# Patient Record
Sex: MALE | Race: WHITE | NOT HISPANIC OR LATINO | Employment: FULL TIME | URBAN - METROPOLITAN AREA
[De-identification: names, ages, dates, MRNs, and addresses within clinical notes are randomized per-mention and may not be internally consistent; named-entity substitution may affect disease eponyms.]

---

## 2018-09-11 ENCOUNTER — APPOINTMENT (OUTPATIENT)
Dept: RADIOLOGY | Facility: CLINIC | Age: 43
End: 2018-09-11
Payer: COMMERCIAL

## 2018-09-11 ENCOUNTER — OFFICE VISIT (OUTPATIENT)
Dept: URGENT CARE | Facility: CLINIC | Age: 43
End: 2018-09-11
Payer: COMMERCIAL

## 2018-09-11 VITALS
SYSTOLIC BLOOD PRESSURE: 134 MMHG | DIASTOLIC BLOOD PRESSURE: 84 MMHG | WEIGHT: 272 LBS | HEART RATE: 88 BPM | BODY MASS INDEX: 42.69 KG/M2 | RESPIRATION RATE: 16 BRPM | HEIGHT: 67 IN | TEMPERATURE: 99.2 F | OXYGEN SATURATION: 99 %

## 2018-09-11 DIAGNOSIS — S63.502A SPRAIN OF LEFT WRIST, INITIAL ENCOUNTER: Primary | ICD-10-CM

## 2018-09-11 DIAGNOSIS — M25.532 LEFT WRIST PAIN: ICD-10-CM

## 2018-09-11 PROCEDURE — 73110 X-RAY EXAM OF WRIST: CPT

## 2018-09-11 PROCEDURE — 99203 OFFICE O/P NEW LOW 30 MIN: CPT | Performed by: PHYSICIAN ASSISTANT

## 2018-09-11 RX ORDER — GABAPENTIN 600 MG/1
300 TABLET ORAL 3 TIMES DAILY
COMMUNITY

## 2018-09-11 NOTE — PATIENT INSTRUCTIONS
Wrist Sprain   WHAT YOU NEED TO KNOW:   What is a wrist sprain? A wrist sprain happens when one or more ligaments in your wrist stretch or tear  Ligaments are tough tissues that connect bones and keep them in place, and support your joints  A fall onto your outstretched hand can cause a wrist sprain  An injury that causes your wrist to twist can also cause a sprain  This may happen during sports, such as biking, skiing, or snowboarding  What are the signs and symptoms of a wrist sprain? · Swelling and tenderness    · Pain and stiffness     · Bruising or changes in skin color    · Popping sound in your wrist when you move it  How is a wrist sprain diagnosed? Your healthcare provider will ask how you injured your wrist  The provider will examine your wrist and hand and ask about your symptoms  You may need x-rays, an MRI, or a CT scan of your wrist  You may be given contrast liquid to help the pictures show up better  Tell the healthcare provider if you have ever had an allergic reaction to contrast liquid  Do not enter the MRI room with anything metal  Metal can cause serious injury  Tell the healthcare provider if you have any metal in or on your body  How is a wrist sprain treated? Treatment depends on how severe your sprain is  You may need any of the following:  · NSAIDs , such as ibuprofen, help decrease swelling, pain, and fever  NSAIDs can cause stomach bleeding or kidney problems in certain people  If you take blood thinner medicine, always ask your healthcare provider if NSAIDs are safe for you  Always read the medicine label and follow directions  · Acetaminophen  decreases pain and fever  It is available without a doctor's order  Ask how much to take and how often to take it  Follow directions  Read the labels of all other medicines you are using to see if they also contain acetaminophen, or ask your doctor or pharmacist  Acetaminophen can cause liver damage if not taken correctly   Do not use more than 4 grams (4,000 milligrams) total of acetaminophen in one day  · A splint or cast  helps support your wrist and prevent more damage  · Surgery  may be needed if you have a severe sprain  Arthroscopy may be done to examine the inside of your wrist joint and repair ligament damage  Arthroscopy uses a scope that is inserted through a small incision  You may need open surgery to reconnect torn ligaments to the bone  · Physical therapy  may be recommended  A physical therapist teaches you exercises to help improve movement and strength, and to decrease pain  How can I manage my symptoms? · Rest  your wrist for at least 48 hours  Avoid activities that cause pain  · Ice  your wrist for 15 to 20 minutes every hour or as directed  Use an ice pack, or put crushed ice in a plastic bag  Cover it with a towel before you put it on your wrist  Ice helps prevent tissue damage and decreases swelling and pain  · Compress  your wrist with an elastic bandage  This will help decrease swelling, support your wrist, and help it heal  Wear your wrist wrap as directed  The elastic bandage should be snug but not tight  · Elevate  your wrist above the level of your heart as often as you can  This will help decrease swelling and pain  Prop your wrist on pillows or blankets to keep it elevated comfortably  When should I seek immediate care? · You have severe pain or swelling  · Your injured wrist is red or has red streaks spreading from the injured area  · You have new trouble moving your hands, fingers, or wrist     · Your wrist, hand, or fingers feel cold or numb  · Your fingernails turn blue or gray  When should I contact my healthcare provider? · Your symptoms get worse  · Your sprain does not get better within 2 weeks  · You have questions or concerns about your condition or care  CARE AGREEMENT:   You have the right to help plan your care   Learn about your health condition and how it may be treated  Discuss treatment options with your caregivers to decide what care you want to receive  You always have the right to refuse treatment  The above information is an  only  It is not intended as medical advice for individual conditions or treatments  Talk to your doctor, nurse or pharmacist before following any medical regimen to see if it is safe and effective for you  © 2017 [employer]  Information is for End User's use only and may not be sold, redistributed or otherwise used for commercial purposes  All illustrations and images included in CareNotes® are the copyrighted property of [employer]  or [employer]

## 2018-09-13 NOTE — PROGRESS NOTES
[facility]        NAME: Elias Bhandari is a 41 y o  male  : 1975    MRN: [id number]  DATE: 2018  TIME: 8:15 AM    Assessment and Plan   Sprain of left wrist, initial encounter [S63 502A]  1  Sprain of left wrist, initial encounter  XR wrist 3+ vw left    Ambulatory referral to Physical Therapy     Patient Instructions     Apply ice 10-20 min at a time at least 4 times a day  Take tylenol/motrin for pain  Go to PT for further management  Follow up with PCP in 3-5 days  Proceed to  ER if symptoms worsen  Chief Complaint     Chief Complaint   Patient presents with    Wrist Pain     Pt fell a few months ago and injured left wrist  He was never evaluated for this injury  Yesterday when picking up 50 pound bag of dog food, pt noted sharp left wrist pain  Now c/o 10 wrist pain, worse with   History of Present Illness       Wrist Pain    The pain is present in the left wrist  This is a new problem  The current episode started yesterday  There has been a history of trauma  The problem occurs constantly  The problem has been gradually worsening  The quality of the pain is described as aching  The pain is moderate  Associated symptoms include joint swelling  Pertinent negatives include no fever, inability to bear weight, itching, joint locking, limited range of motion, numbness, stiffness or tingling  The symptoms are aggravated by activity  He has tried NSAIDS for the symptoms  The treatment provided mild relief  Family history does not include gout or rheumatoid arthritis  There is no history of diabetes, gout, osteoarthritis or rheumatoid arthritis  Review of Systems   Review of Systems   Constitutional: Negative for fever  Musculoskeletal: Positive for arthralgias and myalgias  Negative for back pain, gait problem, gout, joint swelling, neck pain, neck stiffness and stiffness  Skin: Negative for color change, itching, pallor, rash and wound     Neurological: Negative for dizziness, tingling, tremors, seizures, syncope, facial asymmetry, speech difficulty, weakness, light-headedness, numbness and headaches  Current Medications       Current Outpatient Prescriptions:     ClomiPRAMINE HCl (ANAFRANIL PO), Take by mouth, Disp: , Rfl:     CLONAZEPAM PO, Take by mouth, Disp: , Rfl:     CLONIDINE HCL PO, Take by mouth, Disp: , Rfl:     gabapentin (NEURONTIN) 600 MG tablet, Take 300 mg by mouth 3 (three) times a day, Disp: , Rfl:     METFORMIN HCL PO, Take by mouth, Disp: , Rfl:     Current Allergies     Allergies as of 09/11/2018    (No Known Allergies)            The following portions of the patient's history were reviewed and updated as appropriate: allergies, current medications, past family history, past medical history, past social history, past surgical history and problem list      Past Medical History:   Diagnosis Date    Anxiety     [facility]        History reviewed  No pertinent surgical history  No family history on file  Medications have been verified  Objective   /84   Pulse 88   Temp 99 2 °F (37 3 °C)   Resp 16   Ht 5' 7" (1 702 m)   Wt 123 kg (272 lb)   SpO2 99%   BMI 42 60 kg/m²        Physical Exam     Physical Exam   Constitutional: He appears well-developed and well-nourished  Cardiovascular: Normal rate, regular rhythm, normal heart sounds and intact distal pulses  Exam reveals no gallop and no friction rub  No murmur heard  Pulmonary/Chest: Effort normal and breath sounds normal  No respiratory distress  He has no wheezes  He has no rales  Abdominal: Soft  Bowel sounds are normal  He exhibits no distension  There is no tenderness  There is no rebound and no guarding  Musculoskeletal:        Left wrist: He exhibits decreased range of motion, tenderness and swelling  He exhibits no bony tenderness, no effusion, no crepitus, no deformity and no laceration

## 2020-01-15 DIAGNOSIS — F32.A DEPRESSED: Primary | ICD-10-CM

## 2020-01-15 RX ORDER — VILAZODONE HYDROCHLORIDE 40 MG/1
TABLET ORAL
Qty: 30 TABLET | Refills: 0 | Status: SHIPPED | OUTPATIENT
Start: 2020-01-15

## 2020-01-15 RX ORDER — CLOMIPRAMINE HYDROCHLORIDE 75 MG/1
CAPSULE ORAL
Qty: 90 CAPSULE | Refills: 0 | Status: SHIPPED | OUTPATIENT
Start: 2020-01-15

## 2020-02-11 DIAGNOSIS — F32.A DEPRESSED: ICD-10-CM

## 2020-02-12 RX ORDER — CLOMIPRAMINE HYDROCHLORIDE 75 MG/1
CAPSULE ORAL
Qty: 90 CAPSULE | Refills: 0 | OUTPATIENT
Start: 2020-02-12

## 2020-02-12 RX ORDER — VILAZODONE HYDROCHLORIDE 40 MG/1
TABLET ORAL
Qty: 30 TABLET | Refills: 0 | OUTPATIENT
Start: 2020-02-12

## 2020-02-12 RX ORDER — CLONAZEPAM 1 MG/1
TABLET ORAL
Qty: 120 TABLET | OUTPATIENT
Start: 2020-02-12

## 2020-02-13 DIAGNOSIS — F32.A DEPRESSED: ICD-10-CM

## 2020-02-14 RX ORDER — CLOMIPRAMINE HYDROCHLORIDE 75 MG/1
CAPSULE ORAL
Qty: 90 CAPSULE | Refills: 0 | OUTPATIENT
Start: 2020-02-14

## 2020-02-14 RX ORDER — CLONAZEPAM 1 MG/1
TABLET ORAL
Qty: 120 TABLET | OUTPATIENT
Start: 2020-02-14

## 2023-06-26 ENCOUNTER — OFFICE VISIT (OUTPATIENT)
Dept: URGENT CARE | Facility: CLINIC | Age: 48
End: 2023-06-26
Payer: COMMERCIAL

## 2023-06-26 VITALS
WEIGHT: 257 LBS | OXYGEN SATURATION: 97 % | BODY MASS INDEX: 40.34 KG/M2 | HEART RATE: 80 BPM | TEMPERATURE: 97.6 F | HEIGHT: 67 IN | DIASTOLIC BLOOD PRESSURE: 86 MMHG | RESPIRATION RATE: 18 BRPM | SYSTOLIC BLOOD PRESSURE: 131 MMHG

## 2023-06-26 DIAGNOSIS — L05.91 PILONIDAL CYST: Primary | ICD-10-CM

## 2023-06-26 PROCEDURE — 99214 OFFICE O/P EST MOD 30 MIN: CPT | Performed by: PHYSICIAN ASSISTANT

## 2023-06-26 RX ORDER — AMLODIPINE BESYLATE 10 MG/1
1 TABLET ORAL DAILY
COMMUNITY
Start: 2023-02-20

## 2023-06-26 RX ORDER — ATORVASTATIN CALCIUM 20 MG/1
1 TABLET, FILM COATED ORAL EVERY EVENING
COMMUNITY
Start: 2023-04-03

## 2023-06-26 RX ORDER — ASPIRIN 81 MG/1
81 TABLET, CHEWABLE ORAL DAILY
COMMUNITY

## 2023-06-26 RX ORDER — OMEGA-3S/DHA/EPA/FISH OIL/D3 300MG-1000
400 CAPSULE ORAL DAILY
COMMUNITY

## 2023-06-26 RX ORDER — SULFAMETHOXAZOLE AND TRIMETHOPRIM 800; 160 MG/1; MG/1
1 TABLET ORAL EVERY 12 HOURS SCHEDULED
Qty: 14 TABLET | Refills: 0 | Status: SHIPPED | OUTPATIENT
Start: 2023-06-26 | End: 2023-07-03

## 2023-06-26 RX ORDER — LISINOPRIL AND HYDROCHLOROTHIAZIDE 20; 12.5 MG/1; MG/1
2 TABLET ORAL DAILY
COMMUNITY
Start: 2023-04-25

## 2023-06-26 NOTE — PROGRESS NOTES
[facility]        NAME: Aron Chung is a 57 y o  male  : 1975    MRN: [id number]  DATE: 2023  TIME: 7:17 PM    Assessment and Plan   Pilonidal cyst [L05 91]  1  Pilonidal cyst  sulfamethoxazole-trimethoprim (BACTRIM DS) 800-160 mg per tablet    Ambulatory Referral to General Surgery            Patient Instructions   Antibiotic  Sitz bath's  Follow-up with general surgery  Follow up with PCP in 3-5 days  Proceed to  ER if symptoms worsen  Chief Complaint     Chief Complaint   Patient presents with   • Pilonidal Cyst     Had cyst removed 25 years ago; pt thinks it is back  Noticed it Saturday, uncomfortable, blood in toilet          History of Present Illness       Is a 29-year-old male significant past medical history of diabetes resents the office complaining of cyst to his tailbone for 3 days  Reports he has some discomfort in this area but that is not abnormal for him as he has a large scar from pilonidal cyst excision from 25 years ago  Discomfort has been increasing and he noticed a spot of pink pus on the toilet paper when he wiped after the bathroom  Pain is rated 2 out of 10      Review of Systems   Review of Systems   Constitutional: Negative for fever  Gastrointestinal: Negative for blood in stool  Skin: Positive for color change           Current Medications       Current Outpatient Medications:   •  amLODIPine (NORVASC) 10 mg tablet, Take 1 tablet by mouth daily, Disp: , Rfl:   •  aspirin 81 mg chewable tablet, Chew 81 mg daily, Disp: , Rfl:   •  atorvastatin (LIPITOR) 20 mg tablet, Take 1 tablet by mouth every evening, Disp: , Rfl:   •  cholecalciferol (VITAMIN D3) 400 units tablet, Take 400 Units by mouth daily, Disp: , Rfl:   •  clomiPRAMINE (ANAFRANIL) 75 MG capsule, take 3 capsules by mouth at bedtime, Disp: 90 capsule, Rfl: 0  •  CLONAZEPAM PO, Take by mouth, Disp: , Rfl:   •  gabapentin (NEURONTIN) 600 MG tablet, Take 300 mg by mouth 3 (three) times a "day, Disp: , Rfl:   •  lisinopril-hydrochlorothiazide (PRINZIDE,ZESTORETIC) 20-12 5 MG per tablet, Take 2 tablets by mouth daily, Disp: , Rfl:   •  metFORMIN (GLUCOPHAGE) 1000 MG tablet, Take 1,000 mg by mouth 2 (two) times a day with meals, Disp: , Rfl:   •  sulfamethoxazole-trimethoprim (BACTRIM DS) 800-160 mg per tablet, Take 1 tablet by mouth every 12 (twelve) hours for 7 days, Disp: 14 tablet, Rfl: 0  •  VIIBRYD 40 MG tablet, take 1 tablet by mouth once daily, Disp: 30 tablet, Rfl: 0  •  ClomiPRAMINE HCl (ANAFRANIL PO), Take by mouth (Patient not taking: Reported on 6/26/2023), Disp: , Rfl:   •  CLONIDINE HCL PO, Take by mouth (Patient not taking: Reported on 6/26/2023), Disp: , Rfl:   •  METFORMIN HCL PO, Take by mouth (Patient not taking: Reported on 6/26/2023), Disp: , Rfl:     Current Allergies     Allergies as of 06/26/2023   • (No Known Allergies)            The following portions of the patient's history were reviewed and updated as appropriate: allergies, current medications, past family history, past medical history, past social history, past surgical history and problem list      Past Medical History:   Diagnosis Date   • Anxiety    • Diabetes mellitus ([facility] )        Past Surgical History:   Procedure Laterality Date   • KNEE SURGERY         History reviewed  No pertinent family history  Medications have been verified  Objective   /86   Pulse 80   Temp 97 6 °F (36 4 °C)   Resp 18   Ht 5' 7\" (1 702 m)   Wt 117 kg (257 lb)   SpO2 97%   BMI 40 25 kg/m²   No LMP for male patient  Physical Exam     Physical Exam  Vitals and nursing note reviewed  Constitutional:       Appearance: He is well-developed  HENT:      Head: Normocephalic and atraumatic  Right Ear: External ear normal       Left Ear: External ear normal       Nose: Nose normal    Eyes:      General: Lids are normal       Conjunctiva/sclera: Conjunctivae normal    Skin:     General: Skin is warm and dry        " Capillary Refill: Capillary refill takes less than 2 seconds  Findings: Rash and wound present  Neurological:      Mental Status: He is alert